# Patient Record
Sex: MALE | Race: WHITE | URBAN - METROPOLITAN AREA
[De-identification: names, ages, dates, MRNs, and addresses within clinical notes are randomized per-mention and may not be internally consistent; named-entity substitution may affect disease eponyms.]

---

## 2019-03-29 ENCOUNTER — OUTPATIENT (OUTPATIENT)
Dept: OUTPATIENT SERVICES | Facility: HOSPITAL | Age: 34
LOS: 1 days | End: 2019-03-29
Payer: COMMERCIAL

## 2019-03-29 PROCEDURE — 70553 MRI BRAIN STEM W/O & W/DYE: CPT | Mod: 26

## 2019-03-29 PROCEDURE — 72157 MRI CHEST SPINE W/O & W/DYE: CPT | Mod: 26

## 2022-04-01 PROBLEM — Z00.00 ENCOUNTER FOR PREVENTIVE HEALTH EXAMINATION: Status: ACTIVE | Noted: 2022-04-01

## 2022-04-14 ENCOUNTER — APPOINTMENT (OUTPATIENT)
Dept: CARDIOLOGY | Facility: CLINIC | Age: 37
End: 2022-04-14
Payer: COMMERCIAL

## 2022-04-14 ENCOUNTER — NON-APPOINTMENT (OUTPATIENT)
Age: 37
End: 2022-04-14

## 2022-04-14 VITALS
OXYGEN SATURATION: 96 % | BODY MASS INDEX: 25.73 KG/M2 | SYSTOLIC BLOOD PRESSURE: 128 MMHG | HEART RATE: 88 BPM | HEIGHT: 72 IN | WEIGHT: 190 LBS | TEMPERATURE: 97.3 F | DIASTOLIC BLOOD PRESSURE: 70 MMHG

## 2022-04-14 DIAGNOSIS — Z78.9 OTHER SPECIFIED HEALTH STATUS: ICD-10-CM

## 2022-04-14 DIAGNOSIS — Z82.49 FAMILY HISTORY OF ISCHEMIC HEART DISEASE AND OTHER DISEASES OF THE CIRCULATORY SYSTEM: ICD-10-CM

## 2022-04-14 DIAGNOSIS — F17.200 NICOTINE DEPENDENCE, UNSPECIFIED, UNCOMPLICATED: ICD-10-CM

## 2022-04-14 DIAGNOSIS — R07.9 CHEST PAIN, UNSPECIFIED: ICD-10-CM

## 2022-04-14 PROCEDURE — 99204 OFFICE O/P NEW MOD 45 MIN: CPT

## 2022-04-14 RX ORDER — CHROMIUM 200 MCG
TABLET ORAL
Refills: 0 | Status: ACTIVE | COMMUNITY

## 2022-04-14 RX ORDER — TRAMADOL HYDROCHLORIDE 50 MG/1
50 TABLET, COATED ORAL 3 TIMES DAILY
Refills: 0 | Status: ACTIVE | COMMUNITY

## 2022-04-14 NOTE — ASSESSMENT
[FreeTextEntry1] : Chest discomfort: The patient will not be able to exercise adequately for diagnostic purposes because of multiple sclerosis and associated ambulatory difficulties.  Pharmacologic nuclear stress testing is the best option to rule out ischemia.  Transthoracic echocardiography has been arranged to rule out pericardial disease.  Patient has been advised to call 911 for any chest discomfort lasting over 15 minutes.\par \par There is no history of abnormal bleeding.  Baby aspirin 81 mg daily has been started.

## 2022-04-14 NOTE — REASON FOR VISIT
[FreeTextEntry1] : The patient is complain of chest pain.  The patient has multiple sclerosis.  The patient walks with a cane.  He can walk about 15 minutes.  There is no exertional dyspnea or chest discomfort.  Recently the patient lost both his parents.  This caused extreme out the stress.  In the setting of severe stress the patient developed chest discomfort syndrome.  He describes a heavy or tight-like sensation.  It is unrelated to exertion.  It has lasted days at a time.  In addition he intermittently gets episodes which are shorter in duration.  The patient's father had a myocardial infarction at a fairly young age.  The patient's father was a cigarette smoker.  The patient currently is a cigarette smoker.

## 2022-05-05 ENCOUNTER — APPOINTMENT (OUTPATIENT)
Dept: CARDIOLOGY | Facility: CLINIC | Age: 37
End: 2022-05-05

## 2022-05-12 ENCOUNTER — APPOINTMENT (OUTPATIENT)
Dept: CARDIOLOGY | Facility: CLINIC | Age: 37
End: 2022-05-12